# Patient Record
Sex: MALE | Race: WHITE | NOT HISPANIC OR LATINO | URBAN - METROPOLITAN AREA
[De-identification: names, ages, dates, MRNs, and addresses within clinical notes are randomized per-mention and may not be internally consistent; named-entity substitution may affect disease eponyms.]

---

## 2023-02-13 ENCOUNTER — APPOINTMENT (OUTPATIENT)
Dept: URBAN - METROPOLITAN AREA SURGERY 17 | Age: 63
Setting detail: DERMATOLOGY
End: 2023-02-14

## 2023-02-13 VITALS
SYSTOLIC BLOOD PRESSURE: 148 MMHG | HEART RATE: 60 BPM | RESPIRATION RATE: 14 BRPM | TEMPERATURE: 97.8 F | WEIGHT: 197 LBS | DIASTOLIC BLOOD PRESSURE: 76 MMHG | HEIGHT: 72 IN

## 2023-02-13 DIAGNOSIS — L82.1 OTHER SEBORRHEIC KERATOSIS: ICD-10-CM

## 2023-02-13 DIAGNOSIS — L81.4 OTHER MELANIN HYPERPIGMENTATION: ICD-10-CM

## 2023-02-13 DIAGNOSIS — L57.0 ACTINIC KERATOSIS: ICD-10-CM

## 2023-02-13 DIAGNOSIS — Z71.89 OTHER SPECIFIED COUNSELING: ICD-10-CM

## 2023-02-13 DIAGNOSIS — D22 MELANOCYTIC NEVI: ICD-10-CM

## 2023-02-13 DIAGNOSIS — Z85.828 PERSONAL HISTORY OF OTHER MALIGNANT NEOPLASM OF SKIN: ICD-10-CM

## 2023-02-13 DIAGNOSIS — Z94.4 LIVER TRANSPLANT STATUS: ICD-10-CM

## 2023-02-13 PROBLEM — C44.629 SQUAMOUS CELL CARCINOMA OF SKIN OF LEFT UPPER LIMB, INCLUDING SHOULDER: Status: ACTIVE | Noted: 2023-02-13

## 2023-02-13 PROBLEM — D04.122 CARCINOMA IN SITU OF SKIN OF LEFT LOWER EYELID, INCLUDING CANTHUS: Status: ACTIVE | Noted: 2023-02-13

## 2023-02-13 PROBLEM — D22.39 MELANOCYTIC NEVI OF OTHER PARTS OF FACE: Status: ACTIVE | Noted: 2023-02-13

## 2023-02-13 PROBLEM — C44.319 BASAL CELL CARCINOMA OF SKIN OF OTHER PARTS OF FACE: Status: ACTIVE | Noted: 2023-02-13

## 2023-02-13 PROCEDURE — OTHER SURGICAL DECISION MAKING: OTHER

## 2023-02-13 PROCEDURE — OTHER DESTRUCTION WITH CO2 LASER: OTHER

## 2023-02-13 PROCEDURE — 17003 DESTRUCT PREMALG LES 2-14: CPT | Mod: 59

## 2023-02-13 PROCEDURE — OTHER MALIGNANT DESTRUCTION WITH CO2 LASER: OTHER

## 2023-02-13 PROCEDURE — OTHER CONSULTATION FOR MOHS SURGERY: OTHER

## 2023-02-13 PROCEDURE — 17312 MOHS ADDL STAGE: CPT

## 2023-02-13 PROCEDURE — 17000 DESTRUCT PREMALG LESION: CPT | Mod: 59

## 2023-02-13 PROCEDURE — OTHER BENIGN DESTRUCTION: CO2 LASER: OTHER

## 2023-02-13 PROCEDURE — 17280 DSTR MAL LS F/E/E/N/L/M .5/<: CPT | Mod: 59

## 2023-02-13 PROCEDURE — 99203 OFFICE O/P NEW LOW 30 MIN: CPT | Mod: 57

## 2023-02-13 PROCEDURE — 17311 MOHS 1 STAGE H/N/HF/G: CPT

## 2023-02-13 PROCEDURE — OTHER MOHS SURGERY: OTHER

## 2023-02-13 PROCEDURE — OTHER COUNSELING: OTHER

## 2023-02-13 PROCEDURE — 17313 MOHS 1 STAGE T/A/L: CPT

## 2023-02-13 PROCEDURE — 14041 TIS TRNFR F/C/C/M/N/A/G/H/F: CPT

## 2023-02-13 PROCEDURE — OTHER MIPS QUALITY: OTHER

## 2023-02-13 ASSESSMENT — LOCATION DETAILED DESCRIPTION DERM
LOCATION DETAILED: LEFT PROXIMAL DORSAL FOREARM
LOCATION DETAILED: RIGHT INFERIOR CRUS OF ANTIHELIX
LOCATION DETAILED: RIGHT PROXIMAL DORSAL FOREARM
LOCATION DETAILED: RIGHT RADIAL DORSAL HAND
LOCATION DETAILED: LEFT MEDIAL MALAR CHEEK
LOCATION DETAILED: RIGHT CENTRAL MALAR CHEEK
LOCATION DETAILED: MEDIAL FRONTAL SCALP
LOCATION DETAILED: RIGHT DISTAL DORSAL FOREARM
LOCATION DETAILED: LEFT INFERIOR MEDIAL FOREHEAD
LOCATION DETAILED: RIGHT VENTRAL PROXIMAL FOREARM

## 2023-02-13 ASSESSMENT — LOCATION SIMPLE DESCRIPTION DERM
LOCATION SIMPLE: LEFT FOREHEAD
LOCATION SIMPLE: LEFT CHEEK
LOCATION SIMPLE: RIGHT CHEEK
LOCATION SIMPLE: RIGHT EAR
LOCATION SIMPLE: RIGHT HAND
LOCATION SIMPLE: RIGHT FOREARM
LOCATION SIMPLE: LEFT FOREARM
LOCATION SIMPLE: FRONTAL SCALP

## 2023-02-13 ASSESSMENT — LOCATION ZONE DERM
LOCATION ZONE: HAND
LOCATION ZONE: EAR
LOCATION ZONE: SCALP
LOCATION ZONE: FACE
LOCATION ZONE: ARM

## 2023-02-13 NOTE — PROCEDURE: SURGICAL DECISION MAKING
Complexity (Necessary For Coding; Major - 90 Day Global With Some Exceptions; Minor - 10 Day Global): major
Initial Decision For Surgery?: Yes
Risk Assessment Explanation (Does Not Render In The Note): Clinical determination of the probability and/or consequences of an event, such as surgery. Clinical assessment of the level of risk is affected by the nature of the event under consideration for the patient. Modifier 57 is used to indicate an Evaluation and Management (E/M) service resulted in the initial decision to perform surgery either the day before a major surgery (90 day global) or the day of a major surgery.
Discussion: After the surgical removal of the tumor was completed an evaluation was performed to determine the best and most appropriate method of surgical reconstruction. Considered options included second intention, layered closure, delayed closure, flap/tissue rearrangement, and various forms of skin grafts. The patient was found to have a particular skin cancer and defect that due to its size and location will require either a flap and tissue transfer or a full thickness skin graft to enable complete closure that restores anatomic function without local distortion and preserves and/or restores cosmetic appearance that is not possible with a layered closure. The decision was made today to proceed with reconstruction utilizing a skin graft or flap for this particular cancer and defect taking into account the entire patient’s medical history, health status, mental status, age, medications, and desire for cosmetic outcome weighed against the particular flap or graft chosen with its own associated risks, benefits, and possible complications. The type of cancer (and its associated risk factors) that caused the surgical defect and the defect itself were also both considered in the decision making process. This procedure and its risks, benefits, side effects, possible complications, and alternatives have been discussed with the patient in detail. This evaluation and management decision was completely separate from the Mohs procedure that removed the tumor.
Date Of Surgery - Today Or Tomorrow?: today

## 2023-02-13 NOTE — PROCEDURE: MOHS SURGERY

## 2023-02-13 NOTE — PROCEDURE: MOHS SURGERY
Please call with any questions or concerns regarding your clinic visit today.    It is a pleasure being involved in your health care.    Contacts post-consultation depending on your need:    Radiology Appointments [phone]    Schedule Clinic Appointments [phone] # 1   M-F 7:30 - 5 pm    [facility] Choi [phone]    Clinic Fax Number [phone]    Surgery Scheduling [phone]    My Chart is available 24 hours a day and is a secure way to access your records and communicate with your care team.  I strongly recommend signing up if you haven't already done so, if you are comfortable with computers.  If you would like to inquire about this or are having problems with My Chart access, you may call [phone] or go online at [email or web address].  Please allow at least 24 hours for a response and extra time on weekends and Holidays.       Surgeon/Pathologist Verbiage (Will Incorporate Name Of Surgeon From Intro If Not Blank): operated in two distinct and integrated capacities as the surgeon and pathologist.

## 2023-02-13 NOTE — PROCEDURE: MIPS QUALITY
Detail Level: Detailed
Quality 130: Documentation Of Current Medications In The Medical Record: Current Medications Documented
Quality 110: Preventive Care And Screening: Influenza Immunization: Influenza Immunization not Administered for Documented Reasons.
Additional Notes: Patient does not wish to receive the flu vaccine.
Quality 226: Preventive Care And Screening: Tobacco Use: Screening And Cessation Intervention: Patient screened for tobacco use and is an ex/non-smoker

## 2023-02-13 NOTE — PROCEDURE: MOHS SURGERY
Body Location Override (Optional - Billing Will Still Be Based On Selected Body Map Location If Applicable): right jaw

## 2023-02-13 NOTE — PROCEDURE: BENIGN DESTRUCTION: CO2 LASER
Laser Type: UltraPulse Fractional CO2 laser
Was Feathering Performed?: No
Post-Care Instructions: I reviewed with the patient in detail post-care instructions. Patient should avoid sun until the area is fully healed. Pt should apply vaseline or A&D to the treated areas. A wound care sheet was provided.
Pulse Duration (Usec): 1000
External Cooling Fan Speed: 5
Detail Level: Detailed
Energy(Mj-Leave At Zero If Unwanted): 200
Feathering Statement: Following the treatment the wound edges were feathered using 260mJ.
Anesthesia Volume In Cc: 3
Treatment Number: 1
Laser Mode: Fractionated
Anesthesia Type: 0.5% lidocaine with 1:200,000 epinephrine and a 1:10 solution of 8.4% sodium bicarbonate
Medical Necessity Information: It is in your best interest to select a reason for this procedure from the list below. All of these items fulfill various CMS LCD requirements except the new and changing color options.
Spot Sizes: 5mm
Medical Necessity Clause: This procedure was medically necessary because the lesions that were treated were:
Wavelength: 10,600nm
Power (Mccrary-Leave At Zero If Unwanted): 40
Consent: Written consent obtained, risks reviewed including but not limited to crusting, scabbing, blistering, scarring, darker or lighter pigmentary change, incomplete improvement, infection and bleeding.  The eyes were protected with either goggles or eye shields.
Post-Care Statement: Following the procedure, A&D ointment and a bandage were applied.
Passes: 2

## 2023-02-13 NOTE — PROCEDURE: MOHS SURGERY

## 2023-02-13 NOTE — PROCEDURE: COUNSELING
Detail Level: Generalized
Sunscreen Recommendations: Sunscreen therapy (SPF 30 or higher) was recommended to the patient due to their significantly higher risk of developing another skin cancer. They were counseled that this is up to 10X greater with 1 cancer and even higher with 2 or more. It was explained that failure to use sunscreen protection can result in an even higher risk of skin cancer due to increased genetic hits in sun-damaged skin cells. Sunscreen use by preventing more skin cancers can keep patients out of the higher risk group with 2 or more cancers. Physical blocking sunscreens that contain zinc/titanium are preferred over chemical sunscreens. Proper and adequate application and reapplication after exercise or bathing was also reviewed. The patient was also counseled on sun safety and other techniques for sun avoidance.
Nicotinamide Supplementation Recommendations: Nicotinamide 500mg BID was recommended to help reduce the risk of BCC & SCC by 23% and AK’s from 14-35%. The usage of this OTC form of vitamin B3 was reviewed with the patient due to their history of skin cancer. The medication must be taken continuously to maintain the benefit.
Detail Level: Zone
Sunscreen Recommendations: Sunscreen therapy (SPF 30 or higher) was recommended to the patient due to their significantly higher risk of developing another skin cancer. They were counseled that this is up to 10X greater with 1 cancer and even higher with 2 or more. It was explained that failure to use sunscreen protection can result in an even higher risk of skin cancer due to increased genetic hits in sun damaged skin cells. Sunscreen use by preventing more skin cancers can keep patients out of the higher risk group with 2 or more cancers. Physical blocking sunscreens that contain zinc/titanium are preferred over chemical sunscreens. Proper and adequate application and reapplication after exercise or bathing was also reviewed. The patient was also counseled on sun safety and other techniques for sun avoidance.

## 2023-02-13 NOTE — PROCEDURE: MOHS SURGERY
Ketoconazole Pregnancy And Lactation Text: This medication is Pregnancy Category C and it isn't know if it is safe during pregnancy. It is also excreted in breast milk and breast feeding isn't recommended. Post-care instructions were reviewed in detail. A handout was provided. Patient is not to engage in any heavy lifting, exercise, or swimming for the next 14 days. Should the patient develop any fevers, chills, bleeding, severe pain patient will contact the office immediately.\\n\\nA dose of both Tylenol 1000mg and ibuprofen 400mg (at the same time) was recommended shortly after surgery, and every 6 hours as needed for pain. Pain not adequately relieved or severe pain should be reported to our office or the provider on call. Avoid any additional aspirin containing products. Cell phone numbers of the providers were handed out. \\n\\nIce packs should also be used post surgically and may be placed over the wound dressing to help with pain and swelling, and bleeding. The ice pack is placed over the wound for fifteen minutes and may be repeated four to six times per day for 2-3 days.

## 2023-02-13 NOTE — PROCEDURE: CONSULTATION FOR MOHS SURGERY
Detail Level: Detailed
Body Location Override (Optional - Billing Will Still Be Based On Selected Body Map Location If Applicable): right jaw
Size Of Lesion: 0.7
X Size Of Lesion In Cm (Optional): 1.3
Incorporate Mauc In Note: Yes

## 2023-02-13 NOTE — PROCEDURE: MOHS SURGERY

## 2023-02-13 NOTE — PROCEDURE: MOHS SURGERY
Patient's mother/guardian calls here to report that patient was recently around someone who is awaiting test results for COVID19. Pt is not showing symptoms. Guardian wondering if you want patient's appointment changed from in person to virtual. Guardian reports patient is doing well and stable. Appointment: 10/14/2020 with LUIS ALBERTO Singh. Call to confirm.   Mohs : Xander Magdaleno MD

## 2023-02-13 NOTE — PROCEDURE: MALIGNANT DESTRUCTION WITH CO2 LASER
Detail Level: Detailed
Size Of Lesion In Cm (Required): 0.3
Was Feathering Performed?: No
Anesthesia Type: 0.5% lidocaine with 1:200,000 epinephrine
Anesthesia Volume In Cc: 0.6
External Cooling Fan Speed: 5
Laser Type: UltraPulse Fractional CO2 laser
Laser Mode: Pulsed
Wavelength: 10,600nm
Power (Mccrary-Leave At Zero If Unwanted): 40
Energy(Mj-Leave At Zero If Unwanted): 200
Spot Sizes: 5mm
Passes: 2
Pulse Duration (Usec): 1000
Feathering Statement: Following the treatment the wound edges were feathered using 260mJ.
Post-Care Statement: Following the procedure, vaseline and a pressure bandage with telfa were applied.
Consent: Written consent obtained, risks reviewed including but not limited to crusting, scabbing, blistering, scarring, darker or lighter pigmentary change, incomplete improvement, infection and bleeding.
Post-Care Instructions: I reviewed with the patient in detail post-care instructions. Patient should avoid sun exposure until the area is fully healed and for several months after to avoid hyperpigmentation. Patient should apply vaseline to treated areas and bandage as instructed. A detailed wound care handout was provided and reviewed.
Bill As A Line Item Or As Units: Line Item

## 2023-11-22 ENCOUNTER — APPOINTMENT (OUTPATIENT)
Dept: URBAN - METROPOLITAN AREA SURGERY 17 | Age: 63
Setting detail: DERMATOLOGY
End: 2023-11-22

## 2023-11-22 VITALS
DIASTOLIC BLOOD PRESSURE: 88 MMHG | TEMPERATURE: 98 F | HEART RATE: 60 BPM | RESPIRATION RATE: 14 BRPM | SYSTOLIC BLOOD PRESSURE: 130 MMHG

## 2023-11-22 DIAGNOSIS — Z71.89 OTHER SPECIFIED COUNSELING: ICD-10-CM

## 2023-11-22 DIAGNOSIS — L81.4 OTHER MELANIN HYPERPIGMENTATION: ICD-10-CM

## 2023-11-22 DIAGNOSIS — D22 MELANOCYTIC NEVI: ICD-10-CM

## 2023-11-22 DIAGNOSIS — L82.1 OTHER SEBORRHEIC KERATOSIS: ICD-10-CM

## 2023-11-22 DIAGNOSIS — Z94.4 LIVER TRANSPLANT STATUS: ICD-10-CM

## 2023-11-22 DIAGNOSIS — Z85.828 PERSONAL HISTORY OF OTHER MALIGNANT NEOPLASM OF SKIN: ICD-10-CM

## 2023-11-22 DIAGNOSIS — L57.0 ACTINIC KERATOSIS: ICD-10-CM

## 2023-11-22 DIAGNOSIS — D84.9 IMMUNODEFICIENCY, UNSPECIFIED: ICD-10-CM

## 2023-11-22 PROBLEM — D22.39 MELANOCYTIC NEVI OF OTHER PARTS OF FACE: Status: ACTIVE | Noted: 2023-11-22

## 2023-11-22 PROBLEM — D22.61 MELANOCYTIC NEVI OF RIGHT UPPER LIMB, INCLUDING SHOULDER: Status: ACTIVE | Noted: 2023-11-22

## 2023-11-22 PROBLEM — C44.329 SQUAMOUS CELL CARCINOMA OF SKIN OF OTHER PARTS OF FACE: Status: ACTIVE | Noted: 2023-11-22

## 2023-11-22 PROCEDURE — OTHER MOHS SURGERY: OTHER

## 2023-11-22 PROCEDURE — OTHER MIPS QUALITY: OTHER

## 2023-11-22 PROCEDURE — OTHER CONSULTATION FOR MOHS SURGERY: OTHER

## 2023-11-22 PROCEDURE — OTHER SURGICAL DECISION MAKING: OTHER

## 2023-11-22 PROCEDURE — 17311 MOHS 1 STAGE H/N/HF/G: CPT

## 2023-11-22 PROCEDURE — 14040 TIS TRNFR F/C/C/M/N/A/G/H/F: CPT

## 2023-11-22 PROCEDURE — OTHER COUNSELING: OTHER

## 2023-11-22 PROCEDURE — 99213 OFFICE O/P EST LOW 20 MIN: CPT | Mod: 25,57

## 2023-11-22 ASSESSMENT — LOCATION ZONE DERM
LOCATION ZONE: FACE
LOCATION ZONE: HAND
LOCATION ZONE: SCALP
LOCATION ZONE: ARM

## 2023-11-22 ASSESSMENT — LOCATION SIMPLE DESCRIPTION DERM
LOCATION SIMPLE: LEFT CHEEK
LOCATION SIMPLE: RIGHT POSTERIOR UPPER ARM
LOCATION SIMPLE: FRONTAL SCALP
LOCATION SIMPLE: RIGHT FOREARM
LOCATION SIMPLE: RIGHT UPPER ARM
LOCATION SIMPLE: RIGHT HAND
LOCATION SIMPLE: LEFT FOREHEAD
LOCATION SIMPLE: RIGHT CHEEK
LOCATION SIMPLE: LEFT FOREARM

## 2023-11-22 ASSESSMENT — LOCATION DETAILED DESCRIPTION DERM
LOCATION DETAILED: RIGHT PROXIMAL DORSAL FOREARM
LOCATION DETAILED: RIGHT ANTERIOR DISTAL UPPER ARM
LOCATION DETAILED: RIGHT DISTAL POSTERIOR UPPER ARM
LOCATION DETAILED: RIGHT CENTRAL MALAR CHEEK
LOCATION DETAILED: RIGHT RADIAL DORSAL HAND
LOCATION DETAILED: MEDIAL FRONTAL SCALP
LOCATION DETAILED: LEFT MEDIAL MALAR CHEEK
LOCATION DETAILED: LEFT PROXIMAL DORSAL FOREARM
LOCATION DETAILED: LEFT INFERIOR MEDIAL FOREHEAD
LOCATION DETAILED: RIGHT VENTRAL PROXIMAL FOREARM

## 2023-11-22 NOTE — PROCEDURE: CONSULTATION FOR MOHS SURGERY
Detail Level: Detailed
Body Location Override (Optional - Billing Will Still Be Based On Selected Body Map Location If Applicable): left cheek
Size Of Lesion: 0.7
X Size Of Lesion In Cm (Optional): 0.5
Name Of The Referring Provider For Procedure: Ama Nye PA-C
Incorporate Mauc In Note: Yes

## 2023-11-22 NOTE — PROCEDURE: SURGICAL DECISION MAKING
Initial Decision For Surgery?: Yes
Discussion: After the surgical removal of the tumor was completed an evaluation was performed to determine the best and most appropriate method of surgical reconstruction. Considered options included second intention, layered closure, delayed closure, flap/tissue rearrangement, and various forms of skin grafts. The patient was found to have a particular skin cancer and defect that due to its size and location will require either a flap and tissue transfer or a full thickness skin graft to enable complete closure that restores anatomic function without local distortion and preserves and/or restores cosmetic appearance that is not possible with a layered closure. The decision was made today to proceed with reconstruction utilizing a skin graft or flap for this particular cancer and defect taking into account the entire patient’s medical history, health status, mental status, age, medications, and desire for cosmetic outcome weighed against the particular flap or graft chosen with its own associated risks, benefits, and possible complications. The type of cancer (and its associated risk factors) that caused the surgical defect and the defect itself were also both considered in the decision making process. This procedure and its risks, benefits, side effects, possible complications, and alternatives have been discussed with the patient in detail. This evaluation and management decision was completely separate from the Mohs procedure that removed the tumor.
Risk Assessment Explanation (Does Not Render In The Note): Clinical determination of the probability and/or consequences of an event, such as surgery. Clinical assessment of the level of risk is affected by the nature of the event under consideration for the patient. Modifier 57 is used to indicate an Evaluation and Management (E/M) service resulted in the initial decision to perform surgery either the day before a major surgery (90 day global) or the day of a major surgery.
Date Of Surgery - Today Or Tomorrow?: today
Complexity (Necessary For Coding; Major - 90 Day Global With Some Exceptions; Minor - 10 Day Global): major

## 2023-11-22 NOTE — PROCEDURE: MOHS SURGERY
You can access the FollowMyHealth Patient Portal offered by [facility] by registering at the following website: [email or web address]. By joining [facility]’s FollowMyHealth portal, you will also be able to view your health information using other applications (apps) compatible with our system. Consent (Lip)/Introductory Paragraph: The rationale for Mohs was explained to the patient and consent was obtained. The risks, benefits and alternatives to therapy were discussed in detail. Specifically, the risks of lip deformity, changes in the oral aperture, infection, scarring, bleeding, prolonged wound healing, incomplete removal, allergy to anesthesia, nerve injury and recurrence were addressed. Prior to the procedure, the treatment site was clearly identified and confirmed by the patient. All components of Universal Protocol/PAUSE Rule completed.

## 2023-11-22 NOTE — PROCEDURE: MOHS SURGERY
Bilateral Helical Rim Advancement Flap Text: The defect edges were debeveled with a #15 blade scalpel.  Given the location of the defect and the proximity to free margins (helical rim) a bilateral helical rim advancement flap was deemed most appropriate.  Using a sterile surgical marker, the appropriate advancement flaps were drawn incorporating the defect and placing the expected incisions between the helical rim and antihelix where possible.  The area thus outlined was incised through and through with a #15 scalpel blade.  With a skin hook and iris scissors, the flaps were gently and sharply undermined and freed up. Mastoid Interpolation Flap Text: A decision was made to reconstruct the defect utilizing an interpolation axial flap and a staged reconstruction.  A telfa template was made of the defect.  This telfa template was then used to outline the mastoid interpolation flap.  The donor area for the pedicle flap was then injected with anesthesia.  The flap was excised through the skin and subcutaneous tissue down to the layer of the underlying musculature.  The pedicle flap was carefully excised within this deep plane to maintain its blood supply.  The edges of the donor site were undermined.   The donor site was closed in a primary fashion.  The pedicle was then rotated into position and sutured.  Once the tube was sutured into place, adequate blood supply was confirmed with blanching and refill.  The pedicle was then wrapped with xeroform gauze and dressed appropriately with a telfa and gauze bandage to ensure continued blood supply and protect the attached pedicle.
